# Patient Record
Sex: MALE | Race: WHITE | NOT HISPANIC OR LATINO | Employment: OTHER | URBAN - METROPOLITAN AREA
[De-identification: names, ages, dates, MRNs, and addresses within clinical notes are randomized per-mention and may not be internally consistent; named-entity substitution may affect disease eponyms.]

---

## 2023-04-19 LAB
ALANINE AMINOTRANSFERASE (SGPT) (U/L) IN SER/PLAS: 23 U/L (ref 10–52)
ALBUMIN (G/DL) IN SER/PLAS: 4.6 G/DL (ref 3.4–5)
ALKALINE PHOSPHATASE (U/L) IN SER/PLAS: 71 U/L (ref 33–136)
ANION GAP IN SER/PLAS: 15 MMOL/L (ref 10–20)
ASPARTATE AMINOTRANSFERASE (SGOT) (U/L) IN SER/PLAS: 23 U/L (ref 9–39)
BASOPHILS (10*3/UL) IN BLOOD BY AUTOMATED COUNT: 0.03 X10E9/L (ref 0–0.1)
BASOPHILS/100 LEUKOCYTES IN BLOOD BY AUTOMATED COUNT: 0.6 % (ref 0–2)
BILIRUBIN TOTAL (MG/DL) IN SER/PLAS: 0.9 MG/DL (ref 0–1.2)
CALCIUM (MG/DL) IN SER/PLAS: 9.6 MG/DL (ref 8.6–10.6)
CARBON DIOXIDE, TOTAL (MMOL/L) IN SER/PLAS: 25 MMOL/L (ref 21–32)
CHLORIDE (MMOL/L) IN SER/PLAS: 109 MMOL/L (ref 98–107)
CHOLESTEROL (MG/DL) IN SER/PLAS: 210 MG/DL (ref 0–199)
CHOLESTEROL IN HDL (MG/DL) IN SER/PLAS: 49.4 MG/DL
CHOLESTEROL/HDL RATIO: 4.3
CREATININE (MG/DL) IN SER/PLAS: 1.42 MG/DL (ref 0.5–1.3)
EOSINOPHILS (10*3/UL) IN BLOOD BY AUTOMATED COUNT: 0.23 X10E9/L (ref 0–0.7)
EOSINOPHILS/100 LEUKOCYTES IN BLOOD BY AUTOMATED COUNT: 4.2 % (ref 0–6)
ERYTHROCYTE DISTRIBUTION WIDTH (RATIO) BY AUTOMATED COUNT: 13.1 % (ref 11.5–14.5)
ERYTHROCYTE MEAN CORPUSCULAR HEMOGLOBIN CONCENTRATION (G/DL) BY AUTOMATED: 33.3 G/DL (ref 32–36)
ERYTHROCYTE MEAN CORPUSCULAR VOLUME (FL) BY AUTOMATED COUNT: 94 FL (ref 80–100)
ERYTHROCYTES (10*6/UL) IN BLOOD BY AUTOMATED COUNT: 5.31 X10E12/L (ref 4.5–5.9)
GFR MALE: 53 ML/MIN/1.73M2
GLUCOSE (MG/DL) IN SER/PLAS: 90 MG/DL (ref 74–99)
HEMATOCRIT (%) IN BLOOD BY AUTOMATED COUNT: 49.8 % (ref 41–52)
HEMOGLOBIN (G/DL) IN BLOOD: 16.6 G/DL (ref 13.5–17.5)
IMMATURE GRANULOCYTES/100 LEUKOCYTES IN BLOOD BY AUTOMATED COUNT: 0.6 % (ref 0–0.9)
LDL: 133 MG/DL (ref 0–99)
LEUKOCYTES (10*3/UL) IN BLOOD BY AUTOMATED COUNT: 5.4 X10E9/L (ref 4.4–11.3)
LYMPHOCYTES (10*3/UL) IN BLOOD BY AUTOMATED COUNT: 1.19 X10E9/L (ref 1.2–4.8)
LYMPHOCYTES/100 LEUKOCYTES IN BLOOD BY AUTOMATED COUNT: 21.9 % (ref 13–44)
MONOCYTES (10*3/UL) IN BLOOD BY AUTOMATED COUNT: 0.65 X10E9/L (ref 0.1–1)
MONOCYTES/100 LEUKOCYTES IN BLOOD BY AUTOMATED COUNT: 11.9 % (ref 2–10)
NEUTROPHILS (10*3/UL) IN BLOOD BY AUTOMATED COUNT: 3.31 X10E9/L (ref 1.2–7.7)
NEUTROPHILS/100 LEUKOCYTES IN BLOOD BY AUTOMATED COUNT: 60.8 % (ref 40–80)
NRBC (PER 100 WBCS) BY AUTOMATED COUNT: 0 /100 WBC (ref 0–0)
PLATELETS (10*3/UL) IN BLOOD AUTOMATED COUNT: 176 X10E9/L (ref 150–450)
POTASSIUM (MMOL/L) IN SER/PLAS: 4.5 MMOL/L (ref 3.5–5.3)
PROSTATE SPECIFIC AG (NG/ML) IN SER/PLAS: 0.88 NG/ML (ref 0–4)
PROTEIN TOTAL: 7.2 G/DL (ref 6.4–8.2)
SODIUM (MMOL/L) IN SER/PLAS: 144 MMOL/L (ref 136–145)
THYROTROPIN (MIU/L) IN SER/PLAS BY DETECTION LIMIT <= 0.05 MIU/L: 1.46 MIU/L (ref 0.44–3.98)
THYROXINE (T4) FREE (NG/DL) IN SER/PLAS: 1.06 NG/DL (ref 0.78–1.48)
TRIGLYCERIDE (MG/DL) IN SER/PLAS: 139 MG/DL (ref 0–149)
UREA NITROGEN (MG/DL) IN SER/PLAS: 26 MG/DL (ref 6–23)
VLDL: 28 MG/DL (ref 0–40)

## 2023-11-22 ENCOUNTER — OFFICE VISIT (OUTPATIENT)
Dept: PRIMARY CARE | Facility: CLINIC | Age: 68
End: 2023-11-22
Payer: MEDICARE

## 2023-11-22 VITALS
RESPIRATION RATE: 16 BRPM | SYSTOLIC BLOOD PRESSURE: 132 MMHG | BODY MASS INDEX: 30.95 KG/M2 | OXYGEN SATURATION: 99 % | WEIGHT: 261 LBS | DIASTOLIC BLOOD PRESSURE: 70 MMHG | HEART RATE: 56 BPM

## 2023-11-22 DIAGNOSIS — I10 BENIGN ESSENTIAL HYPERTENSION: ICD-10-CM

## 2023-11-22 DIAGNOSIS — J40 BRONCHITIS: Primary | ICD-10-CM

## 2023-11-22 DIAGNOSIS — N28.9 ABNORMAL KIDNEY FUNCTION: ICD-10-CM

## 2023-11-22 DIAGNOSIS — E78.00 HYPERCHOLESTEROLEMIA: ICD-10-CM

## 2023-11-22 PROBLEM — I73.9 PERIPHERAL VASCULAR DISEASE (CMS-HCC): Status: RESOLVED | Noted: 2023-08-18 | Resolved: 2023-11-22

## 2023-11-22 PROBLEM — M54.2 NECK PAIN: Status: ACTIVE | Noted: 2023-11-22

## 2023-11-22 PROBLEM — N40.0 BENIGN PROSTATIC HYPERPLASIA: Status: ACTIVE | Noted: 2023-04-26

## 2023-11-22 PROBLEM — M50.90 CERVICAL DISC DISEASE: Status: ACTIVE | Noted: 2023-11-22

## 2023-11-22 PROBLEM — R53.83 MALAISE AND FATIGUE: Status: ACTIVE | Noted: 2023-11-22

## 2023-11-22 PROBLEM — I51.7 CARDIOMEGALY: Status: ACTIVE | Noted: 2022-11-10

## 2023-11-22 PROBLEM — R53.81 MALAISE AND FATIGUE: Status: ACTIVE | Noted: 2023-11-22

## 2023-11-22 PROBLEM — M54.50 LOW BACK PAIN SYNDROME: Status: ACTIVE | Noted: 2023-11-22

## 2023-11-22 PROBLEM — H16.149 SPK (SUPERFICIAL PUNCTATE KERATITIS): Status: ACTIVE | Noted: 2023-11-22

## 2023-11-22 PROBLEM — I73.9 PERIPHERAL VASCULAR DISEASE (CMS-HCC): Status: ACTIVE | Noted: 2023-08-18

## 2023-11-22 PROBLEM — N17.9 AKI (ACUTE KIDNEY INJURY) (CMS-HCC): Status: ACTIVE | Noted: 2023-08-18

## 2023-11-22 PROBLEM — S16.1XXA CERVICAL MYOFASCIAL STRAIN: Status: ACTIVE | Noted: 2023-11-22

## 2023-11-22 PROCEDURE — 1036F TOBACCO NON-USER: CPT | Performed by: INTERNAL MEDICINE

## 2023-11-22 PROCEDURE — 99214 OFFICE O/P EST MOD 30 MIN: CPT | Performed by: INTERNAL MEDICINE

## 2023-11-22 PROCEDURE — 3075F SYST BP GE 130 - 139MM HG: CPT | Performed by: INTERNAL MEDICINE

## 2023-11-22 PROCEDURE — 3078F DIAST BP <80 MM HG: CPT | Performed by: INTERNAL MEDICINE

## 2023-11-22 PROCEDURE — 1159F MED LIST DOCD IN RCRD: CPT | Performed by: INTERNAL MEDICINE

## 2023-11-22 RX ORDER — AZITHROMYCIN 500 MG/1
500 TABLET, FILM COATED ORAL DAILY
Qty: 5 TABLET | Refills: 0 | Status: SHIPPED | OUTPATIENT
Start: 2023-11-22 | End: 2023-11-27

## 2023-11-22 RX ORDER — ATORVASTATIN CALCIUM 10 MG/1
5 TABLET, FILM COATED ORAL
COMMUNITY
End: 2024-04-23 | Stop reason: SDUPTHER

## 2023-11-22 RX ORDER — PANTOPRAZOLE SODIUM 40 MG/1
40 TABLET, DELAYED RELEASE ORAL DAILY
COMMUNITY
End: 2023-11-22 | Stop reason: ALTCHOICE

## 2023-11-22 RX ORDER — ACETAMINOPHEN 325 MG/1
500 TABLET ORAL
COMMUNITY
Start: 2023-08-15

## 2023-11-22 RX ORDER — TAMSULOSIN HYDROCHLORIDE 0.4 MG/1
0.4 CAPSULE ORAL
COMMUNITY
Start: 2023-08-15 | End: 2023-11-22 | Stop reason: ALTCHOICE

## 2023-11-22 ASSESSMENT — ENCOUNTER SYMPTOMS
LOSS OF SENSATION IN FEET: 0
NEUROLOGICAL NEGATIVE: 1
MUSCULOSKELETAL NEGATIVE: 1
CARDIOVASCULAR NEGATIVE: 1
PSYCHIATRIC NEGATIVE: 1
GASTROINTESTINAL NEGATIVE: 1
EYES NEGATIVE: 1
DEPRESSION: 0
RESPIRATORY NEGATIVE: 1
OCCASIONAL FEELINGS OF UNSTEADINESS: 0
ALLERGIC/IMMUNOLOGIC NEGATIVE: 1
ENDOCRINE NEGATIVE: 1
CONSTITUTIONAL NEGATIVE: 1
HEMATOLOGIC/LYMPHATIC NEGATIVE: 1

## 2023-11-22 NOTE — PROGRESS NOTES
Subjective   Patient ID: Miah Ventura is a 68 y.o. male who presents for Follow-up.cough for 3 weeks denies SOB and no VALENTINE and los weight and no fever no fatigue  - only a productive cough of now is white greenish it was dark the first week     HPI     Review of Systems   Constitutional: Negative.    HENT: Negative.     Eyes: Negative.    Respiratory: Negative.     Cardiovascular: Negative.    Gastrointestinal: Negative.    Endocrine: Negative.    Musculoskeletal: Negative.    Skin: Negative.    Allergic/Immunologic: Negative.    Neurological: Negative.    Hematological: Negative.    Psychiatric/Behavioral: Negative.     All other systems reviewed and are negative.      Objective   /70   Pulse 56   Resp 16   Wt 118 kg (261 lb)   SpO2 99%   BMI 30.95 kg/m²     Physical Exam  Vitals and nursing note reviewed.   Constitutional:       Appearance: Normal appearance.   HENT:      Head: Normocephalic and atraumatic.      Right Ear: Tympanic membrane, ear canal and external ear normal.      Left Ear: Tympanic membrane, ear canal and external ear normal. There is no impacted cerumen.      Nose: Nose normal.      Mouth/Throat:      Mouth: Mucous membranes are moist.      Pharynx: Oropharynx is clear.   Eyes:      Extraocular Movements: Extraocular movements intact.      Conjunctiva/sclera: Conjunctivae normal.      Pupils: Pupils are equal, round, and reactive to light.   Cardiovascular:      Rate and Rhythm: Normal rate and regular rhythm.      Pulses: Normal pulses.      Heart sounds: Normal heart sounds. No murmur heard.  Pulmonary:      Effort: Pulmonary effort is normal. No respiratory distress.      Breath sounds: Normal breath sounds. No stridor. No wheezing, rhonchi or rales.   Chest:      Chest wall: No tenderness.   Abdominal:      General: Abdomen is flat. Bowel sounds are normal. There is no distension.      Palpations: Abdomen is soft. There is no mass.      Tenderness: There is no abdominal  tenderness. There is no right CVA tenderness, left CVA tenderness, guarding or rebound.      Hernia: No hernia is present.   Musculoskeletal:         General: Normal range of motion.      Cervical back: Normal range of motion and neck supple.   Skin:     General: Skin is warm.      Capillary Refill: Capillary refill takes less than 2 seconds.   Neurological:      General: No focal deficit present.      Mental Status: He is alert.      Cranial Nerves: No cranial nerve deficit.      Sensory: No sensory deficit.      Motor: No weakness.      Coordination: Coordination normal.      Gait: Gait normal.      Deep Tendon Reflexes: Reflexes normal.   Psychiatric:         Mood and Affect: Mood normal.         Behavior: Behavior normal. Behavior is cooperative.         Thought Content: Thought content normal.         Judgment: Judgment normal.         Assessment/Plan   Problem List Items Addressed This Visit             ICD-10-CM    Abnormal kidney function N28.9     CRI - Chronic Renal Insuficiency. Secondary to DM/HTN/Atherosclerosis. Follow BUN/Cr. and lytes.   RENOPROTECTION with ACEI/ARB (). Monitor microalbuminuria. Avoid hypotension and renal hypoperfusion. The patient was instructed to avoid NSAIDS and educate compliance with medications to control HTN(hypertension) and cholesterol and diabetes.                                     Benign essential hypertension I10     HTN - hypertension well/controlled .Target BP < 130/80  achieved. Educate low salt diet and exercise with weight loss. Educate home self monitoring and diary keeping. Educate risks of elevate blood pressure and benefits of prompt treatment.           Hypercholesterolemia E78.00     Hypercholesterolemia - Monitor lipid profile and educate patient upon risks of high cholesterol and targets. Educate diet and change in lifestyle and increase in exercises - Refill:  Atorvastatin   and educate compliance with medication and diet.           Bronchitis - Primary  J40     Chronic cough for 3 weeks and bronchitis and Bronchitis with no wheezing                                           Recommend:                                                                                                      Denies  Pharyngitis,                                                                                                                                                                               Start: Azithromycin 500 mg daily for 6 days  -                                                                                                                                                             Acute on chronic bronchitis     Allegra 180mg qD vs. Claritin 10 mg qD and Mucinex                                                                                                                                             Cough  - startAzithromycin 500 mg daily for 6 days  -  Avoid cold exposure and take vitamins C 500 and B complex  qD   Hycodan syrup one tea spoon qHS 4 oz.               Sleep apnea - Not/ Witnessed Reviewed with the patient the sleep study and Educate extensively    lifestyle changes = and weight loss and increase in physical activity - - no CPAP at this point -      Nephrolithiasis - left side - and now with status post stenting of the left ureter - and needs a follow up CT of Ultrasound to rule out.persistent nephrolithiasis - Educate extensively diet - to prevent nephrolithiasis -      Right ventricular enlargement vs. failure - and weakness - Reviewed with the patient the ECHO - cardiogram - and rule out sleep apnea which can cause increase in pulmonary pressure and hence the dyspnea of exertion (which now improved) -      Sleep apnea - recommend: sleep study in the lab -      HTN - hypertension well/controlled.Target BP < 130/80 mmHg is achieved. Educate low salt diet and exercise with weight loss. Educate home self monitoring and diary keeping. Educate risks of  elevate blood pressure and benefits of prompt treatment.      Hypercholesterolemia - Monitor lipid profile and educate patient upon risks of high cholesterol and targets. Educate diet and change in lifestyle and increase in exercises - Refill: Atorvastatin 10 mg daily and educate compliance with medication and diet.      Bradycardia - sinus - asymptomatic and monitor - denies shortness of breath or fatigue or chest pain -      Right shoulder pain - DJD - Degenerative Joint Disease, Chronic Arthritis, of the right shoulder. Pain control, and antiinflammatory medications : Recommend: Kenalog injection and start Voltaren gel 1% topically BID, and Tylenol and Tramadol/Vicodan 5/325 mg TID PRN. Educate exercises and referred the patient to PT (Physical Therapy). Get X-Ray's.            Risk Factors Identified During Visit: None.   Influenza: influenza vaccine was previously given.   Pneumovax 23/Prevnar 15: Pneumovax 23/Prevnar 15 vaccine was ordered.   Prevnar 20: Prevnar 20 vaccine was ordered.   Shingles Vaccine: Shingles vaccine was ordered.   Prostate cancer screening: Screening is current.   Colorectal Cancer Screening: screening is current.   Abdominal Aortic Aneurysm screening: screening is current.   HIV screening: screening not indicated.

## 2023-11-22 NOTE — ASSESSMENT & PLAN NOTE
Chronic cough for 3 weeks and bronchitis and Bronchitis with no wheezing                                           Recommend:                                                                                                      Denies  Pharyngitis,                                                                                                                                                                               Start: Azithromycin 500 mg daily for 6 days  -                                                                                                                                                             Acute on chronic bronchitis     Allegra 180mg qD vs. Claritin 10 mg qD and Mucinex                                                                                                                                             Cough  - startAzithromycin 500 mg daily for 6 days  -  Avoid cold exposure and take vitamins C 500 and B complex  qD   Hycodan syrup one tea spoon qHS 4 oz.

## 2023-11-22 NOTE — ASSESSMENT & PLAN NOTE
Hypercholesterolemia - Monitor lipid profile and educate patient upon risks of high cholesterol and targets. Educate diet and change in lifestyle and increase in exercises - Refill:  Atorvastatin   and educate compliance with medication and diet.

## 2023-11-22 NOTE — ASSESSMENT & PLAN NOTE
HTN - hypertension well/controlled .Target BP < 130/80  achieved. Educate low salt diet and exercise with weight loss. Educate home self monitoring and diary keeping. Educate risks of elevate blood pressure and benefits of prompt treatment.

## 2023-11-29 ENCOUNTER — ANCILLARY PROCEDURE (OUTPATIENT)
Dept: RADIOLOGY | Facility: CLINIC | Age: 68
End: 2023-11-29
Payer: MEDICARE

## 2023-11-29 DIAGNOSIS — J40 BRONCHITIS: ICD-10-CM

## 2023-11-29 PROCEDURE — 71046 X-RAY EXAM CHEST 2 VIEWS: CPT

## 2023-11-29 PROCEDURE — 71046 X-RAY EXAM CHEST 2 VIEWS: CPT | Performed by: RADIOLOGY

## 2024-04-22 ENCOUNTER — OFFICE VISIT (OUTPATIENT)
Dept: PRIMARY CARE | Facility: CLINIC | Age: 69
End: 2024-04-22
Payer: MEDICARE

## 2024-04-22 ENCOUNTER — LAB (OUTPATIENT)
Dept: LAB | Facility: LAB | Age: 69
End: 2024-04-22
Payer: MEDICARE

## 2024-04-22 VITALS
HEART RATE: 72 BPM | DIASTOLIC BLOOD PRESSURE: 75 MMHG | RESPIRATION RATE: 16 BRPM | SYSTOLIC BLOOD PRESSURE: 128 MMHG | WEIGHT: 261 LBS | BODY MASS INDEX: 30.82 KG/M2 | HEIGHT: 77 IN

## 2024-04-22 DIAGNOSIS — N40.1 BENIGN PROSTATIC HYPERPLASIA WITH URINARY FREQUENCY: ICD-10-CM

## 2024-04-22 DIAGNOSIS — Z00.00 MEDICARE ANNUAL WELLNESS VISIT, SUBSEQUENT: Primary | ICD-10-CM

## 2024-04-22 DIAGNOSIS — I10 BENIGN ESSENTIAL HYPERTENSION: ICD-10-CM

## 2024-04-22 DIAGNOSIS — R53.83 OTHER FATIGUE: ICD-10-CM

## 2024-04-22 DIAGNOSIS — N28.9 ABNORMAL KIDNEY FUNCTION: ICD-10-CM

## 2024-04-22 DIAGNOSIS — R35.0 BENIGN PROSTATIC HYPERPLASIA WITH URINARY FREQUENCY: ICD-10-CM

## 2024-04-22 DIAGNOSIS — G47.33 OBSTRUCTIVE SLEEP APNEA: ICD-10-CM

## 2024-04-22 DIAGNOSIS — E78.00 ELEVATED LDL CHOLESTEROL LEVEL: ICD-10-CM

## 2024-04-22 DIAGNOSIS — N20.0 NEPHROLITHIASIS: ICD-10-CM

## 2024-04-22 DIAGNOSIS — Z00.00 ROUTINE GENERAL MEDICAL EXAMINATION AT HEALTH CARE FACILITY: ICD-10-CM

## 2024-04-22 PROBLEM — M70.60 GREATER TROCHANTERIC BURSITIS: Status: ACTIVE | Noted: 2024-04-22

## 2024-04-22 PROBLEM — R00.1 SINUS BRADYCARDIA: Status: ACTIVE | Noted: 2024-04-22

## 2024-04-22 PROBLEM — Z94.7 HISTORY OF CORNEA TRANSPLANT: Status: ACTIVE | Noted: 2023-08-18

## 2024-04-22 PROBLEM — R05.9 COUGH: Status: RESOLVED | Noted: 2024-04-22 | Resolved: 2024-04-22

## 2024-04-22 PROBLEM — R26.2 DIFFICULTY IN WALKING: Status: ACTIVE | Noted: 2024-04-22

## 2024-04-22 PROBLEM — M25.551 RIGHT HIP PAIN: Status: ACTIVE | Noted: 2024-04-22

## 2024-04-22 PROBLEM — K40.90 LEFT INGUINAL HERNIA: Status: ACTIVE | Noted: 2024-04-22

## 2024-04-22 PROBLEM — E86.0 DEHYDRATION: Status: ACTIVE | Noted: 2024-04-22

## 2024-04-22 PROBLEM — H52.213 IRREGULAR ASTIGMATISM OF BOTH EYES: Status: ACTIVE | Noted: 2024-04-22

## 2024-04-22 PROBLEM — T16.1XXA FOREIGN BODY IN EAR, RIGHT, INITIAL ENCOUNTER: Status: ACTIVE | Noted: 2024-04-22

## 2024-04-22 PROBLEM — M51.26 DISC DISPLACEMENT, LUMBAR: Status: ACTIVE | Noted: 2024-04-22

## 2024-04-22 PROBLEM — M70.71 BURSITIS OF RIGHT HIP: Status: ACTIVE | Noted: 2024-04-22

## 2024-04-22 PROBLEM — K40.90 RIGHT INGUINAL HERNIA: Status: ACTIVE | Noted: 2023-08-18

## 2024-04-22 PROBLEM — H52.209 ASTIGMATISM FOLLOWING CORNEAL TRANSPLANT: Status: ACTIVE | Noted: 2024-04-22

## 2024-04-22 PROBLEM — R07.89 ATYPICAL CHEST PAIN: Status: RESOLVED | Noted: 2024-04-22 | Resolved: 2024-04-22

## 2024-04-22 PROBLEM — M17.12 ARTHRITIS OF KNEE, LEFT: Status: ACTIVE | Noted: 2024-04-22

## 2024-04-22 PROBLEM — T86.8489 ASTIGMATISM FOLLOWING CORNEAL TRANSPLANT: Status: ACTIVE | Noted: 2024-04-22

## 2024-04-22 PROBLEM — H18.609 KERATOCONUS: Status: ACTIVE | Noted: 2024-04-22

## 2024-04-22 PROBLEM — H25.813 COMBINED FORM OF AGE-RELATED CATARACT, BOTH EYES: Status: ACTIVE | Noted: 2024-04-22

## 2024-04-22 PROBLEM — H52.209 ASTIGMATISM: Status: ACTIVE | Noted: 2023-08-18

## 2024-04-22 PROBLEM — H35.362 DRUSEN OF LEFT MACULA: Status: ACTIVE | Noted: 2023-08-18

## 2024-04-22 PROBLEM — N20.1 LEFT URETERAL CALCULUS: Status: ACTIVE | Noted: 2023-09-07

## 2024-04-22 PROBLEM — H25.13 CATARACT, NUCLEAR SCLEROTIC, BOTH EYES: Status: ACTIVE | Noted: 2024-04-22

## 2024-04-22 LAB
ALBUMIN SERPL BCP-MCNC: 4.6 G/DL (ref 3.4–5)
ALP SERPL-CCNC: 66 U/L (ref 33–136)
ALT SERPL W P-5'-P-CCNC: 19 U/L (ref 10–52)
ANION GAP SERPL CALC-SCNC: 13 MMOL/L (ref 10–20)
AST SERPL W P-5'-P-CCNC: 19 U/L (ref 9–39)
BASOPHILS # BLD AUTO: 0.03 X10*3/UL (ref 0–0.1)
BASOPHILS NFR BLD AUTO: 0.5 %
BILIRUB SERPL-MCNC: 0.6 MG/DL (ref 0–1.2)
BUN SERPL-MCNC: 23 MG/DL (ref 6–23)
CALCIUM SERPL-MCNC: 9.3 MG/DL (ref 8.6–10.6)
CHLORIDE SERPL-SCNC: 108 MMOL/L (ref 98–107)
CHOLEST SERPL-MCNC: 206 MG/DL (ref 0–199)
CHOLESTEROL/HDL RATIO: 3.9
CO2 SERPL-SCNC: 27 MMOL/L (ref 21–32)
CREAT SERPL-MCNC: 1.31 MG/DL (ref 0.5–1.3)
EGFRCR SERPLBLD CKD-EPI 2021: 59 ML/MIN/1.73M*2
EOSINOPHIL # BLD AUTO: 0.18 X10*3/UL (ref 0–0.7)
EOSINOPHIL NFR BLD AUTO: 3.3 %
ERYTHROCYTE [DISTWIDTH] IN BLOOD BY AUTOMATED COUNT: 12.7 % (ref 11.5–14.5)
GLUCOSE SERPL-MCNC: 98 MG/DL (ref 74–99)
HCT VFR BLD AUTO: 45.8 % (ref 41–52)
HDLC SERPL-MCNC: 52.3 MG/DL
HGB BLD-MCNC: 15.4 G/DL (ref 13.5–17.5)
IMM GRANULOCYTES # BLD AUTO: 0.03 X10*3/UL (ref 0–0.7)
IMM GRANULOCYTES NFR BLD AUTO: 0.5 % (ref 0–0.9)
LDLC SERPL CALC-MCNC: 137 MG/DL
LYMPHOCYTES # BLD AUTO: 0.94 X10*3/UL (ref 1.2–4.8)
LYMPHOCYTES NFR BLD AUTO: 17 %
MCH RBC QN AUTO: 31.9 PG (ref 26–34)
MCHC RBC AUTO-ENTMCNC: 33.6 G/DL (ref 32–36)
MCV RBC AUTO: 95 FL (ref 80–100)
MONOCYTES # BLD AUTO: 0.54 X10*3/UL (ref 0.1–1)
MONOCYTES NFR BLD AUTO: 9.8 %
NEUTROPHILS # BLD AUTO: 3.8 X10*3/UL (ref 1.2–7.7)
NEUTROPHILS NFR BLD AUTO: 68.9 %
NON HDL CHOLESTEROL: 154 MG/DL (ref 0–149)
NRBC BLD-RTO: 0 /100 WBCS (ref 0–0)
PLATELET # BLD AUTO: 178 X10*3/UL (ref 150–450)
POTASSIUM SERPL-SCNC: 4.6 MMOL/L (ref 3.5–5.3)
PROT SERPL-MCNC: 7.1 G/DL (ref 6.4–8.2)
PSA SERPL-MCNC: 0.87 NG/ML
RBC # BLD AUTO: 4.83 X10*6/UL (ref 4.5–5.9)
SODIUM SERPL-SCNC: 143 MMOL/L (ref 136–145)
TRIGL SERPL-MCNC: 86 MG/DL (ref 0–149)
TSH SERPL-ACNC: 1.67 MIU/L (ref 0.44–3.98)
VLDL: 17 MG/DL (ref 0–40)
WBC # BLD AUTO: 5.5 X10*3/UL (ref 4.4–11.3)

## 2024-04-22 PROCEDURE — 1160F RVW MEDS BY RX/DR IN RCRD: CPT | Performed by: INTERNAL MEDICINE

## 2024-04-22 PROCEDURE — 80061 LIPID PANEL: CPT

## 2024-04-22 PROCEDURE — 99214 OFFICE O/P EST MOD 30 MIN: CPT | Performed by: INTERNAL MEDICINE

## 2024-04-22 PROCEDURE — 3078F DIAST BP <80 MM HG: CPT | Performed by: INTERNAL MEDICINE

## 2024-04-22 PROCEDURE — 1159F MED LIST DOCD IN RCRD: CPT | Performed by: INTERNAL MEDICINE

## 2024-04-22 PROCEDURE — 3074F SYST BP LT 130 MM HG: CPT | Performed by: INTERNAL MEDICINE

## 2024-04-22 PROCEDURE — 80053 COMPREHEN METABOLIC PANEL: CPT

## 2024-04-22 PROCEDURE — 1170F FXNL STATUS ASSESSED: CPT | Performed by: INTERNAL MEDICINE

## 2024-04-22 PROCEDURE — 36415 COLL VENOUS BLD VENIPUNCTURE: CPT

## 2024-04-22 PROCEDURE — 84153 ASSAY OF PSA TOTAL: CPT

## 2024-04-22 PROCEDURE — 85025 COMPLETE CBC W/AUTO DIFF WBC: CPT

## 2024-04-22 PROCEDURE — 84443 ASSAY THYROID STIM HORMONE: CPT

## 2024-04-22 PROCEDURE — 1036F TOBACCO NON-USER: CPT | Performed by: INTERNAL MEDICINE

## 2024-04-22 PROCEDURE — G0439 PPPS, SUBSEQ VISIT: HCPCS | Performed by: INTERNAL MEDICINE

## 2024-04-22 ASSESSMENT — ENCOUNTER SYMPTOMS
CONSTITUTIONAL NEGATIVE: 1
CARDIOVASCULAR NEGATIVE: 1
NEUROLOGICAL NEGATIVE: 1
ENDOCRINE NEGATIVE: 1
MUSCULOSKELETAL NEGATIVE: 1
DEPRESSION: 0
GASTROINTESTINAL NEGATIVE: 1
PSYCHIATRIC NEGATIVE: 1
OCCASIONAL FEELINGS OF UNSTEADINESS: 0
EYES NEGATIVE: 1
LOSS OF SENSATION IN FEET: 0
RESPIRATORY NEGATIVE: 1
ALLERGIC/IMMUNOLOGIC NEGATIVE: 1
HEMATOLOGIC/LYMPHATIC NEGATIVE: 1

## 2024-04-22 ASSESSMENT — PATIENT HEALTH QUESTIONNAIRE - PHQ9
1. LITTLE INTEREST OR PLEASURE IN DOING THINGS: NOT AT ALL
SUM OF ALL RESPONSES TO PHQ9 QUESTIONS 1 AND 2: 0
2. FEELING DOWN, DEPRESSED OR HOPELESS: NOT AT ALL

## 2024-04-22 ASSESSMENT — ACTIVITIES OF DAILY LIVING (ADL)
DRESSING: INDEPENDENT
GROCERY_SHOPPING: INDEPENDENT
DOING_HOUSEWORK: INDEPENDENT
BATHING: INDEPENDENT
MANAGING_FINANCES: INDEPENDENT
TAKING_MEDICATION: INDEPENDENT

## 2024-04-22 NOTE — ASSESSMENT & PLAN NOTE
BPH - Benign PROSTATIC Hypertrophy, denies Dysuria/Nocturia, check PSA,Educate exercises and Change in life style, prescribe vitamin E 400 IU qD. Consider referral to urology.

## 2024-04-22 NOTE — ASSESSMENT & PLAN NOTE
Status Post lithotrypsy and stenting and removal and and educate extensively diet and increase in fluid intake

## 2024-04-22 NOTE — ASSESSMENT & PLAN NOTE
No recent hospitalizations.    All medications reviewed and reconciled by me the provider..  No use of controlled substances or opiates.    Family history, social history reviewed, no changes.    Patient does not smoke.    Patient does not drink.    Patient hydrates adequately daily.  Eats a well-balanced healthy diet.     Exercises adequately including walking and doing weightbearing exercises.    Patient denies any difficulty with memory or cognition.     Denies any difficulty with hearing.  Patient does not wear hearing aids.    No fall risk.  No recent falls.  Denies any difficulty walking.    Patient with no history of depression anxiety, denies any loss of interest, no feeling of sadness, no lack of motivation.    Patient is independent in all ADLs and IADLs.  Independent bathing, dressing, walking.  Takes care of own finances, shopping and cooking.     End-of-life decision-making power of  reviewed with patient.     Risk Factors Identified During Visit: None.   Influenza: influenza vaccine was previously given.   Pneumovax 23: Pneumovax 23 vaccine was previously given.   Prevnar 13: Prevnar 13 vaccine was previously given.   Shingles Vaccine: Shingles vaccine was previously given.   Prostate cancer screening: Screening is current.   Colorectal Cancer Screening: screening is current.   Abdominal Aortic Aneurysm screening: screening is current.   HIV screening: screening not indicated.       Preventive measures - Recommend ASAP :  Colonoscopy (educate patient risks of colon cancer) refer patient to GI specialist. Ophthalmology and retina exam recommend yearly exams refer patient to an Ophthalmologist. BPH - (Benign Prostatic Hypertrophy) refer patient to an Urologist for rectal exam and PSA check.

## 2024-04-22 NOTE — PROGRESS NOTES
"Subjective   Patient ID: Miah Ventrua is a 69 y.o. male who presents for Medicare Annual Wellness Visit Subsequent (Mcr/Follow up). Losing a lot of weight rosemarie streessed out in the tax season Status Post nephrolithiasis on the left side recurring could not pass it and needed lithotripsy -      HPI     Review of Systems   Constitutional: Negative.    HENT: Negative.     Eyes: Negative.    Respiratory: Negative.     Cardiovascular: Negative.    Gastrointestinal: Negative.    Endocrine: Negative.    Musculoskeletal: Negative.    Skin: Negative.    Allergic/Immunologic: Negative.    Neurological: Negative.    Hematological: Negative.    Psychiatric/Behavioral: Negative.     All other systems reviewed and are negative.      Objective   Ht 1.956 m (6' 5\")   Wt 118 kg (261 lb)   BMI 30.95 kg/m²   Blood pressure 128/75, pulse 72, resp. rate 16, height 1.956 m (6' 5\"), weight 118 kg (261 lb).   Physical Exam  Vitals and nursing note reviewed.   Constitutional:       Appearance: Normal appearance.   HENT:      Head: Normocephalic and atraumatic.      Right Ear: Tympanic membrane, ear canal and external ear normal.      Left Ear: Tympanic membrane, ear canal and external ear normal. There is no impacted cerumen.      Nose: Nose normal.      Mouth/Throat:      Mouth: Mucous membranes are moist.      Pharynx: Oropharynx is clear.   Eyes:      Extraocular Movements: Extraocular movements intact.      Conjunctiva/sclera: Conjunctivae normal.      Pupils: Pupils are equal, round, and reactive to light.   Cardiovascular:      Rate and Rhythm: Normal rate and regular rhythm.      Pulses: Normal pulses.      Heart sounds: Normal heart sounds. No murmur heard.  Pulmonary:      Effort: Pulmonary effort is normal. No respiratory distress.      Breath sounds: Normal breath sounds. No stridor. No wheezing, rhonchi or rales.   Chest:      Chest wall: No tenderness.   Abdominal:      General: Abdomen is flat. Bowel sounds are normal. " There is no distension.      Palpations: Abdomen is soft. There is no mass.      Tenderness: There is no abdominal tenderness. There is no right CVA tenderness, left CVA tenderness, guarding or rebound.      Hernia: No hernia is present.   Musculoskeletal:         General: Normal range of motion.      Cervical back: Normal range of motion and neck supple.   Skin:     General: Skin is warm.      Capillary Refill: Capillary refill takes less than 2 seconds.   Neurological:      General: No focal deficit present.      Mental Status: He is alert.      Cranial Nerves: No cranial nerve deficit.      Sensory: No sensory deficit.      Motor: No weakness.      Coordination: Coordination normal.      Gait: Gait normal.      Deep Tendon Reflexes: Reflexes normal.   Psychiatric:         Mood and Affect: Mood normal.         Behavior: Behavior normal. Behavior is cooperative.         Thought Content: Thought content normal.         Judgment: Judgment normal.         Assessment/Plan   Problem List Items Addressed This Visit             ICD-10-CM    Abnormal kidney function N28.9     CRI - Chronic Renal Insuficiency. Secondary to DM/HTN/Atherosclerosis. Follow BUN/Cr. and lytes.   RENOPROTECTION with ACEI/ARB (). Monitor microalbuminuria. Avoid hypotension and renal hypoperfusion. The patient was instructed to avoid NSAIDS and educate compliance with medications to control HTN(hypertension) and cholesterol and diabetes.                                     Benign essential hypertension I10     HTN - hypertension well/controlled .Target BP < 130/80  achieved. Educate low salt diet and exercise with weight loss. Educate home self monitoring and diary keeping. Educate risks of elevate blood pressure and benefits of prompt treatment.           Relevant Orders    Comprehensive Metabolic Panel    Benign prostatic hyperplasia N40.0     BPH - Benign PROSTATIC Hypertrophy, denies Dysuria/Nocturia, check PSA,Educate exercises and Change in  life style, prescribe vitamin E 400 IU qD. Consider referral to urology.          Relevant Orders    Prostate Specific Antigen    Elevated LDL cholesterol level E78.00     Hypercholesterolemia - Monitor lipid profile and educate patient upon risks of high cholesterol and targets. Educate diet and change in lifestyle and increase in exercises - Refill:   and educate compliance with medication and diet.           Relevant Orders    Lipid Panel    Nephrolithiasis N20.0     Status Post lithotrypsy and stenting and removal and and educate extensively diet and increase in fluid intake          Obstructive sleep apnea G47.33     Lost approximately 20 Lbs and feels better and recommend increase in exercise activity          Medicare annual wellness visit, subsequent - Primary Z00.00     No recent hospitalizations.    All medications reviewed and reconciled by me the provider..  No use of controlled substances or opiates.    Family history, social history reviewed, no changes.    Patient does not smoke.    Patient does not drink.    Patient hydrates adequately daily.  Eats a well-balanced healthy diet.     Exercises adequately including walking and doing weightbearing exercises.    Patient denies any difficulty with memory or cognition.     Denies any difficulty with hearing.  Patient does not wear hearing aids.    No fall risk.  No recent falls.  Denies any difficulty walking.    Patient with no history of depression anxiety, denies any loss of interest, no feeling of sadness, no lack of motivation.    Patient is independent in all ADLs and IADLs.  Independent bathing, dressing, walking.  Takes care of own finances, shopping and cooking.     End-of-life decision-making power of  reviewed with patient.     Risk Factors Identified During Visit: None.   Influenza: influenza vaccine was previously given.   Pneumovax 23: Pneumovax 23 vaccine was previously given.   Prevnar 13: Prevnar 13 vaccine was previously given.    Shingles Vaccine: Shingles vaccine was previously given.   Prostate cancer screening: Screening is current.   Colorectal Cancer Screening: screening is current.   Abdominal Aortic Aneurysm screening: screening is current.   HIV screening: screening not indicated.       Preventive measures - Recommend ASAP :  Colonoscopy (educate patient risks of colon cancer) refer patient to GI specialist. Ophthalmology and retina exam recommend yearly exams refer patient to an Ophthalmologist. BPH - (Benign Prostatic Hypertrophy) refer patient to an Urologist for rectal exam and PSA check.           Other Visit Diagnoses         Codes    Other fatigue     R53.83    Relevant Orders    CBC and Auto Differential    TSH with reflex to Free T4 if abnormal            Abnormal kidney function N28.9        CRI - Chronic Renal Insuficiency. Secondary to DM/HTN/Atherosclerosis. Follow BUN/Cr. and lytes.   RENOPROTECTION with ACEI/ARB (). Monitor microalbuminuria. Avoid hypotension and renal hypoperfusion. The patient was instructed to avoid NSAIDS and educate compliance with medications to control HTN(hypertension) and cholesterol and diabetes.                                       Benign essential hypertension I10       HTN - hypertension well/controlled .Target BP < 130/80  achieved. Educate low salt diet and exercise with weight loss. Educate home self monitoring and diary keeping. Educate risks of elevate blood pressure and benefits of prompt treatment.             Hypercholesterolemia E78.00       Hypercholesterolemia - Monitor lipid profile and educate patient upon risks of high cholesterol and targets. Educate diet and change in lifestyle and increase in exercises - Refill:  Atorvastatin   and educate compliance with medication and diet.                            Sleep apnea - Not/ Witnessed Reviewed with the patient the sleep study and Educate extensively    lifestyle changes = and weight loss and increase in physical activity -  - no CPAP at this point -      Nephrolithiasis - left side - and now with status post stenting of the left ureter - and needs a follow up CT of Ultrasound to rule out.persistent nephrolithiasis - Educate extensively diet - to prevent nephrolithiasis -      Right ventricular enlargement vs. failure - and weakness - Reviewed with the patient the ECHO - cardiogram - and rule out sleep apnea which can cause increase in pulmonary pressure and hence the dyspnea of exertion (which now improved) -      Sleep apnea - recommend: sleep study in the lab -      HTN - hypertension well/controlled.Target BP < 130/80 mmHg is achieved. Educate low salt diet and exercise with weight loss. Educate home self monitoring and diary keeping. Educate risks of elevate blood pressure and benefits of prompt treatment.      Hypercholesterolemia - Monitor lipid profile and educate patient upon risks of high cholesterol and targets. Educate diet and change in lifestyle and increase in exercises - Refill: Atorvastatin 10 mg daily and educate compliance with medication and diet.      Bradycardia - sinus - asymptomatic and monitor - denies shortness of breath or fatigue or chest pain -      Right shoulder pain - DJD - Degenerative Joint Disease, Chronic Arthritis, of the right shoulder. Pain control, and antiinflammatory medications : Recommend: Kenalog injection and start Voltaren gel 1% topically BID, and Tylenol and Tramadol/Vicodan 5/325 mg TID PRN. Educate exercises and referred the patient to PT (Physical Therapy). Get X-Ray's.            Risk Factors Identified During Visit: None.   Influenza: influenza vaccine was previously given.   Pneumovax 23/Prevnar 15: Pneumovax 23/Prevnar 15 vaccine was ordered.   Prevnar 20: Prevnar 20 vaccine was ordered.   Shingles Vaccine: Shingles vaccine was ordered.   Prostate cancer screening: Screening is current.   Colorectal Cancer Screening: screening is current.   Abdominal Aortic Aneurysm screening:  screening is current.   HIV screening: screening not indicated.                   Immunizations/Injections      very important  Immunizations from outside sources need reconciliation.      Influenza, High Dose Seasonal, Preservative Free11/20/2019  Influenza, Seasonal, Quadrivalent, Gzrxzbzmjx28/10/2023  Moderna COVID-19 vaccine, bivalent, blue cap/gray label *Check age/dose*9/10/2022  Pfizer COVID-19 vaccine, Fall 2023, 12 years and older, (30mcg/0.3mL)10/10/2023  Pfizer Gray Cap SARS-CoV-24/23/2022  Pfizer Purple Cap SARS-CoV-210/9/2021, 2/27/2021, 2/6/2021  Tdap vaccine, age 7 year and older (BOOSTRIX, ADACEL)5/8/2

## 2024-04-22 NOTE — ASSESSMENT & PLAN NOTE
Hypercholesterolemia - Monitor lipid profile and educate patient upon risks of high cholesterol and targets. Educate diet and change in lifestyle and increase in exercises - Refill:   and educate compliance with medication and diet.     -Bleeding from circumcision site (boy babies only) [Follow-Up: _____] : a [unfilled] follow-up visit [Other: _____] : [unfilled]

## 2024-04-23 ENCOUNTER — TELEPHONE (OUTPATIENT)
Dept: PRIMARY CARE | Facility: CLINIC | Age: 69
End: 2024-04-23
Payer: MEDICARE

## 2024-04-23 DIAGNOSIS — E78.00 HYPERCHOLESTEROLEMIA: ICD-10-CM

## 2024-04-23 RX ORDER — ATORVASTATIN CALCIUM 10 MG/1
10 TABLET, FILM COATED ORAL
Qty: 90 TABLET | Refills: 0 | Status: SHIPPED | OUTPATIENT
Start: 2024-04-23

## 2024-05-03 ENCOUNTER — OFFICE VISIT (OUTPATIENT)
Dept: OPHTHALMOLOGY | Facility: CLINIC | Age: 69
End: 2024-05-03
Payer: MEDICARE

## 2024-05-03 DIAGNOSIS — H35.362 DRUSEN OF LEFT MACULA: ICD-10-CM

## 2024-05-03 DIAGNOSIS — Z94.7 HISTORY OF CORNEA TRANSPLANT: ICD-10-CM

## 2024-05-03 DIAGNOSIS — H52.213 IRREGULAR ASTIGMATISM OF BOTH EYES: ICD-10-CM

## 2024-05-03 DIAGNOSIS — H04.123 DRY EYES: ICD-10-CM

## 2024-05-03 DIAGNOSIS — H25.813 COMBINED FORMS OF AGE-RELATED CATARACT OF BOTH EYES: ICD-10-CM

## 2024-05-03 DIAGNOSIS — H18.601 KERATOCONUS OF RIGHT EYE: Primary | ICD-10-CM

## 2024-05-03 DIAGNOSIS — H16.122 FILAMENTARY KERATITIS OF LEFT EYE: ICD-10-CM

## 2024-05-03 LAB
CENTRAL CORNEA THICKNESS (OD): 572 MICRONS
CENTRAL CORNEA THICKNESS (OS): 547 MICRONS
KMAX (OD): 47.5 DIOPTERS
KMAX (OS): 56.8 DIOPTERS
PACH THINNEST (OD): 565 MICRONS
PACH THINNEST (OS): 547 MICRONS
SIMK FLAT (OD): 42.5 DIOPTERS
SIMK FLAT (OS): 46.2 DIOPTERS
SIMK STEEP (OD): 44.4 DIOPTERS
SIMK STEEP (OS): 48.9 DIOPTERS
SIMK STEEP AXIS (OD): 136.9 DEGREES
SIMK STEEP AXIS (OS): 77.1 DEGREES

## 2024-05-03 PROCEDURE — 99212 OFFICE O/P EST SF 10 MIN: CPT | Performed by: OPTOMETRIST

## 2024-05-03 PROCEDURE — 92072 FITG C-LENS KERATOCONUS 1ST: CPT | Performed by: OPTOMETRIST

## 2024-05-03 PROCEDURE — 92025 CPTRIZED CORNEAL TOPOGRAPHY: CPT | Performed by: OPTOMETRIST

## 2024-05-03 PROCEDURE — 92015 DETERMINE REFRACTIVE STATE: CPT | Performed by: OPTOMETRIST

## 2024-05-03 PROCEDURE — V2531 CONTACT LENS GAS PERMEABLE: HCPCS | Performed by: OPTOMETRIST

## 2024-05-03 ASSESSMENT — CONF VISUAL FIELD
METHOD: COUNTING FINGERS
OD_SUPERIOR_TEMPORAL_RESTRICTION: 0
OS_INFERIOR_TEMPORAL_RESTRICTION: 0
OS_INFERIOR_NASAL_RESTRICTION: 0
OS_SUPERIOR_TEMPORAL_RESTRICTION: 0
OD_SUPERIOR_NASAL_RESTRICTION: 0
OD_INFERIOR_NASAL_RESTRICTION: 0
OD_INFERIOR_TEMPORAL_RESTRICTION: 0
OS_SUPERIOR_NASAL_RESTRICTION: 0
OS_NORMAL: 1
OD_NORMAL: 1

## 2024-05-03 ASSESSMENT — VISUAL ACUITY
VA_OR_OD_CURRENT_RX: 20/25+1
OD_PH_CC: 20/25
OD_CC: 20/40
CORRECTION_TYPE: GLASSES
VA_OR_OD_CURRENT_RX: 20/25-1
METHOD: SNELLEN - LINEAR
VA_OR_OS_CURRENT_RX: 20/30-2
OS_PH_CC: 20/40
OD_CC+: -1
OS_CC: 20/60
OD_PH_CC+: -2
OD_CC: J1 (-)
OS_PH_CC+: -1
OS_CC+: -1
VA_OR_OS_CURRENT_RX: 20/25-2
OS_CC: J5

## 2024-05-03 ASSESSMENT — ENCOUNTER SYMPTOMS
GASTROINTESTINAL NEGATIVE: 0
ENDOCRINE NEGATIVE: 0
MUSCULOSKELETAL NEGATIVE: 0
PSYCHIATRIC NEGATIVE: 0
HEMATOLOGIC/LYMPHATIC NEGATIVE: 0
RESPIRATORY NEGATIVE: 0
NEUROLOGICAL NEGATIVE: 0
CONSTITUTIONAL NEGATIVE: 0
CARDIOVASCULAR NEGATIVE: 0
ALLERGIC/IMMUNOLOGIC NEGATIVE: 0
EYES NEGATIVE: 1

## 2024-05-03 ASSESSMENT — REFRACTION_CURRENTRX
OD_DIAMETER: 16.0
OS_ADDL_SPECS: 3800 38-44
OD_BRAND: SYNERGYES VS
OS_ADDL_SPECS: 3600 38-44
OD_BRAND: AVT PRIME KONE
OD_SPHERE: PLANO
OS_DIAMETER: 16.0
OS_SPHERE: PLANO
OS_BRAND: SYNERGYES VS
OS_BRAND: AVT PRIME KONE
OD_ADDL_SPECS: 3400 38-44
OS_BASECURVE: 8.4
OS_SPHERE: -17.00
OD_SPHERE: -7.75
OD_ADDL_SPECS: 3400 36-42
OS_BRAND: SYNERGYES VS
OS_SPHERE: -9.50
OS_BASECURVE: 8.4
OD_BASECURVE: 8.4
OD_DIAMETER: 16.0
OD_BRAND: SYNERGYES VS
OD_CYLINDER: SPHERE
OD_SPHERE: -13.50
OS_DIAMETER: 16.5
OD_BASECURVE: 8.4
OD_DIAMETER: 9.6
OS_CYLINDER: SPHERE
OD_BASECURVE: 7.50
OS_BASECURVE: 7.20
OS_DIAMETER: 10.0

## 2024-05-03 ASSESSMENT — REFRACTION_MANIFEST
OD_SPHERE: -9.00
OS_AXIS: 166
OD_ADD: +2.50
OS_CYLINDER: -4.75
OD_CYLINDER: -5.00
OD_AXIS: 044
OS_SPHERE: -17.75
OS_ADD: +2.50

## 2024-05-03 ASSESSMENT — EXTERNAL EXAM - LEFT EYE: OS_EXAM: NORMAL

## 2024-05-03 ASSESSMENT — REFRACTION_WEARINGRX
OS_ADD: +2.50
OS_SPHERE: -16.75
OS_CYLINDER: -6.00
OS_AXIS: 164
OD_AXIS: 044
OD_ADD: +2.50
OD_CYLINDER: -4.00
OD_SPHERE: -10.50

## 2024-05-03 ASSESSMENT — TONOMETRY
OS_IOP_MMHG: 19
OD_IOP_MMHG: 19
IOP_METHOD: GOLDMANN APPLANATION

## 2024-05-03 ASSESSMENT — EXTERNAL EXAM - RIGHT EYE: OD_EXAM: NORMAL

## 2024-05-03 NOTE — PROGRESS NOTES
Assessment/Plan   Diagnoses and all orders for this visit:  Keratoconus of right eye  Dry eyes  History of cornea transplant  Irregular astigmatism of both eyes  Filamentary keratitis of left eye  -Pt currently in AVT [facility]. Fit into [employer] VS OU. Adjusted fit, good comfort & vision. Ordered CL lenses today.   -Once CL arrives, call patient to schedule CL P/U, I&R and DFE.     Drusen of left macula  Combined forms of age-related cataract of both eyes  -Not directly assessed today due to time constraint. RTC once new CL arrive and perform DFE & Mac OCT.        A spectacle prescription was dispensed to be used as needed.

## 2024-05-03 NOTE — Clinical Note
Both eyes (OU) Contact Lens Order  Quantity: 1 Package: RGP Appointment needed? Yes Medically necessary[facility] Additional instructions: RX3 VS lenses

## 2024-05-31 ENCOUNTER — OFFICE VISIT (OUTPATIENT)
Dept: OPHTHALMOLOGY | Facility: CLINIC | Age: 69
End: 2024-05-31
Payer: MEDICARE

## 2024-05-31 DIAGNOSIS — H04.123 DRY EYES: Primary | ICD-10-CM

## 2024-05-31 DIAGNOSIS — Z94.7 HISTORY OF CORNEA TRANSPLANT: ICD-10-CM

## 2024-05-31 DIAGNOSIS — H18.601 KERATOCONUS OF RIGHT EYE: ICD-10-CM

## 2024-05-31 DIAGNOSIS — H52.213 IRREGULAR ASTIGMATISM OF BOTH EYES: ICD-10-CM

## 2024-05-31 PROCEDURE — TAXCU SALES TAX CUYAHOGA COUNTY: Performed by: OPTOMETRIST

## 2024-05-31 PROCEDURE — 99212 OFFICE O/P EST SF 10 MIN: CPT | Performed by: OPTOMETRIST

## 2024-05-31 PROCEDURE — 99070(U19) SCLERAFIL(U19): Performed by: OPTOMETRIST

## 2024-05-31 ASSESSMENT — ENCOUNTER SYMPTOMS
RESPIRATORY NEGATIVE: 0
NEUROLOGICAL NEGATIVE: 0
PSYCHIATRIC NEGATIVE: 0
CONSTITUTIONAL NEGATIVE: 0
ENDOCRINE NEGATIVE: 0
GASTROINTESTINAL NEGATIVE: 0
CARDIOVASCULAR NEGATIVE: 1
MUSCULOSKELETAL NEGATIVE: 0
HEMATOLOGIC/LYMPHATIC NEGATIVE: 0
EYES NEGATIVE: 0
ALLERGIC/IMMUNOLOGIC NEGATIVE: 0

## 2024-05-31 ASSESSMENT — REFRACTION_CURRENTRX
OD_BASECURVE: 8.4
OD_BRAND: SYNERGYES VS
OS_SPHERE: -9.50
OS_BRAND: SYNERGYES VS
OD_ADDL_SPECS: 3400 36-42
OS_CYLINDER: SPHERE
OS_DIAMETER: 16.5
OD_DIAMETER: 16.0
OD_SPHERE: -7.75
OD_CYLINDER: SPHERE
OS_BASECURVE: 8.4
OS_ADDL_SPECS: 3800 38-44

## 2024-05-31 ASSESSMENT — VISUAL ACUITY
OD_CC+: -2
OD_CC: 20/40
OS_CC: 20/25
METHOD: SNELLEN - LINEAR
CORRECTION_TYPE: GLASSES
VA_OR_OD_CURRENT_RX: 20/20
VA_OR_OS_CURRENT_RX: 20/20-2

## 2024-05-31 ASSESSMENT — EXTERNAL EXAM - RIGHT EYE: OD_EXAM: NORMAL

## 2024-05-31 ASSESSMENT — EXTERNAL EXAM - LEFT EYE: OS_EXAM: NORMAL

## 2024-05-31 NOTE — PROGRESS NOTES
Assessment/Plan   Diagnoses and all orders for this visit:  Dry eyes  Keratoconus of right eye  History of cornea transplant  Irregular astigmatism of both eyes    Specialty Contact Lenses:  Specialty contact lenses were dispensed today for treatment of a medically indicated condition. The patient was educated on the proper care, handling, insertion and removal of the lenses.  The patient should follow the wearing time and return for follow-up as indicated, and call or come in to the office if the eye becomes red or painful after wearing the lenses.     Follow up 2-3 weeks and then perform DFE & Mac OCT

## 2024-07-16 ENCOUNTER — OFFICE VISIT (OUTPATIENT)
Dept: PRIMARY CARE | Facility: CLINIC | Age: 69
End: 2024-07-16
Payer: MEDICARE

## 2024-07-16 VITALS
HEIGHT: 77 IN | OXYGEN SATURATION: 96 % | WEIGHT: 261.8 LBS | RESPIRATION RATE: 18 BRPM | BODY MASS INDEX: 30.91 KG/M2 | HEART RATE: 61 BPM

## 2024-07-16 DIAGNOSIS — L03.115 CELLULITIS OF RIGHT LOWER EXTREMITY: Primary | ICD-10-CM

## 2024-07-16 DIAGNOSIS — L97.311 SKIN ULCER OF RIGHT ANKLE, LIMITED TO BREAKDOWN OF SKIN (MULTI): ICD-10-CM

## 2024-07-16 DIAGNOSIS — E78.00 HYPERCHOLESTEROLEMIA: ICD-10-CM

## 2024-07-16 PROCEDURE — 87075 CULTR BACTERIA EXCEPT BLOOD: CPT

## 2024-07-16 PROCEDURE — 13131 CMPLX RPR F/C/C/M/N/AX/G/H/F: CPT | Performed by: INTERNAL MEDICINE

## 2024-07-16 PROCEDURE — 87205 SMEAR GRAM STAIN: CPT

## 2024-07-16 PROCEDURE — 1036F TOBACCO NON-USER: CPT | Performed by: INTERNAL MEDICINE

## 2024-07-16 PROCEDURE — 99213 OFFICE O/P EST LOW 20 MIN: CPT | Performed by: INTERNAL MEDICINE

## 2024-07-16 PROCEDURE — 87070 CULTURE OTHR SPECIMN AEROBIC: CPT

## 2024-07-16 PROCEDURE — 1159F MED LIST DOCD IN RCRD: CPT | Performed by: INTERNAL MEDICINE

## 2024-07-16 RX ORDER — ACETAMINOPHEN 325 MG/1
500 TABLET ORAL EVERY 8 HOURS PRN
Qty: 90 TABLET | Refills: 2 | Status: CANCELLED | OUTPATIENT
Start: 2024-07-16

## 2024-07-16 RX ORDER — CIPROFLOXACIN 500 MG/1
500 TABLET ORAL 2 TIMES DAILY
Qty: 20 TABLET | Refills: 0 | Status: SHIPPED | OUTPATIENT
Start: 2024-07-16 | End: 2024-07-26

## 2024-07-16 RX ORDER — ATORVASTATIN CALCIUM 10 MG/1
10 TABLET, FILM COATED ORAL
Qty: 90 TABLET | Refills: 0 | Status: SHIPPED | OUTPATIENT
Start: 2024-07-16

## 2024-07-16 ASSESSMENT — ENCOUNTER SYMPTOMS
ENDOCRINE NEGATIVE: 1
OCCASIONAL FEELINGS OF UNSTEADINESS: 0
EYES NEGATIVE: 1
NEUROLOGICAL NEGATIVE: 1
RESPIRATORY NEGATIVE: 1
ALLERGIC/IMMUNOLOGIC NEGATIVE: 1
CARDIOVASCULAR NEGATIVE: 1
CONSTITUTIONAL NEGATIVE: 1
GASTROINTESTINAL NEGATIVE: 1
PSYCHIATRIC NEGATIVE: 1
LOSS OF SENSATION IN FEET: 0
HEMATOLOGIC/LYMPHATIC NEGATIVE: 1
MUSCULOSKELETAL NEGATIVE: 1
DEPRESSION: 0

## 2024-07-16 ASSESSMENT — PATIENT HEALTH QUESTIONNAIRE - PHQ9
SUM OF ALL RESPONSES TO PHQ9 QUESTIONS 1 AND 2: 0
1. LITTLE INTEREST OR PLEASURE IN DOING THINGS: NOT AT ALL
2. FEELING DOWN, DEPRESSED OR HOPELESS: NOT AT ALL

## 2024-07-16 NOTE — ASSESSMENT & PLAN NOTE
Ulcer  - lacerated and superficial wound on the  right ankle/leg/ - executed wound care - Wash with hydrogen peroxide and iodine for 3 coats after debridement of a yellowish/ and puss deposit on the wound base. The wound is packed with Xeroform and gauze bandage. Patient is to follow up in 48 hours for further treatment and packing of the wound. Recommend elevation of the limb and swelling prevention( prevention of venous stasis). Cultures taken

## 2024-07-18 ENCOUNTER — OFFICE VISIT (OUTPATIENT)
Dept: PRIMARY CARE | Facility: CLINIC | Age: 69
End: 2024-07-18
Payer: MEDICARE

## 2024-07-18 VITALS
WEIGHT: 261 LBS | OXYGEN SATURATION: 96 % | HEIGHT: 77 IN | RESPIRATION RATE: 16 BRPM | HEART RATE: 70 BPM | BODY MASS INDEX: 30.82 KG/M2

## 2024-07-18 DIAGNOSIS — L03.115 CELLULITIS OF RIGHT LOWER EXTREMITY: ICD-10-CM

## 2024-07-18 DIAGNOSIS — L97.311 SKIN ULCER OF RIGHT ANKLE, LIMITED TO BREAKDOWN OF SKIN (MULTI): Primary | ICD-10-CM

## 2024-07-18 PROBLEM — J20.9 ACUTE BRONCHITIS DUE TO INFECTION: Status: ACTIVE | Noted: 2024-07-18

## 2024-07-18 PROBLEM — R06.09 DYSPNEA ON EXERTION: Status: ACTIVE | Noted: 2024-07-18

## 2024-07-18 PROBLEM — I51.7 RIGHT VENTRICULAR DILATION: Status: ACTIVE | Noted: 2024-07-18

## 2024-07-18 PROBLEM — I51.9 RIGHT VENTRICULAR DYSFUNCTION: Status: ACTIVE | Noted: 2024-07-18

## 2024-07-18 PROBLEM — J45.991 COUGH VARIANT ASTHMA (HHS-HCC): Status: ACTIVE | Noted: 2024-07-18

## 2024-07-18 LAB
BACTERIA SPEC CULT: NORMAL
GRAM STN SPEC: NORMAL
GRAM STN SPEC: NORMAL

## 2024-07-18 ASSESSMENT — ENCOUNTER SYMPTOMS
RESPIRATORY NEGATIVE: 1
CARDIOVASCULAR NEGATIVE: 1
DEPRESSION: 0
PSYCHIATRIC NEGATIVE: 1
OCCASIONAL FEELINGS OF UNSTEADINESS: 0
GASTROINTESTINAL NEGATIVE: 1
MUSCULOSKELETAL NEGATIVE: 1
LOSS OF SENSATION IN FEET: 0
EYES NEGATIVE: 1
CONSTITUTIONAL NEGATIVE: 1
ENDOCRINE NEGATIVE: 1
ALLERGIC/IMMUNOLOGIC NEGATIVE: 1
NEUROLOGICAL NEGATIVE: 1
HEMATOLOGIC/LYMPHATIC NEGATIVE: 1

## 2024-07-18 ASSESSMENT — PATIENT HEALTH QUESTIONNAIRE - PHQ9
1. LITTLE INTEREST OR PLEASURE IN DOING THINGS: NOT AT ALL
2. FEELING DOWN, DEPRESSED OR HOPELESS: NOT AT ALL
SUM OF ALL RESPONSES TO PHQ9 QUESTIONS 1 AND 2: 0

## 2024-07-18 NOTE — PROGRESS NOTES
"Subjective   Patient ID: Miah Ventura is a 69 y.o. male who presents for Follow-up (Bug bite right ankle).    HPI     Review of Systems   Constitutional: Negative.    HENT: Negative.     Eyes: Negative.    Respiratory: Negative.     Cardiovascular: Negative.    Gastrointestinal: Negative.    Endocrine: Negative.    Musculoskeletal: Negative.    Skin: Negative.    Allergic/Immunologic: Negative.    Neurological: Negative.    Hematological: Negative.    Psychiatric/Behavioral: Negative.     All other systems reviewed and are negative.      Objective   Pulse 70   Resp 16   Ht 1.956 m (6' 5\")   Wt 118 kg (261 lb)   SpO2 96%   BMI 30.95 kg/m²     Physical Exam  Vitals and nursing note reviewed.   Constitutional:       Appearance: Normal appearance.   HENT:      Head: Normocephalic and atraumatic.      Right Ear: Tympanic membrane, ear canal and external ear normal.      Left Ear: Tympanic membrane, ear canal and external ear normal. There is no impacted cerumen.      Nose: Nose normal.      Mouth/Throat:      Mouth: Mucous membranes are moist.      Pharynx: Oropharynx is clear.   Eyes:      Extraocular Movements: Extraocular movements intact.      Conjunctiva/sclera: Conjunctivae normal.      Pupils: Pupils are equal, round, and reactive to light.   Cardiovascular:      Rate and Rhythm: Normal rate and regular rhythm.      Pulses: Normal pulses.      Heart sounds: Normal heart sounds. No murmur heard.  Pulmonary:      Effort: Pulmonary effort is normal. No respiratory distress.      Breath sounds: Normal breath sounds. No stridor. No wheezing, rhonchi or rales.   Chest:      Chest wall: No tenderness.   Abdominal:      General: Abdomen is flat. Bowel sounds are normal. There is no distension.      Palpations: Abdomen is soft. There is no mass.      Tenderness: There is no abdominal tenderness. There is no right CVA tenderness, left CVA tenderness, guarding or rebound.      Hernia: No hernia is present. "   Musculoskeletal:         General: Normal range of motion.      Cervical back: Normal range of motion and neck supple.   Skin:     General: Skin is warm.      Capillary Refill: Capillary refill takes less than 2 seconds.   Neurological:      General: No focal deficit present.      Mental Status: He is alert.      Cranial Nerves: No cranial nerve deficit.      Sensory: No sensory deficit.      Motor: No weakness.      Coordination: Coordination normal.      Gait: Gait normal.      Deep Tendon Reflexes: Reflexes normal.   Psychiatric:         Mood and Affect: Mood normal.         Behavior: Behavior normal. Behavior is cooperative.         Thought Content: Thought content normal.         Judgment: Judgment normal.         Assessment/Plan   Problem List Items Addressed This Visit             ICD-10-CM    Cellulitis of right lower extremity L03.115     Start Cipro 500 mg BID for 10 days          Skin ulcer of right ankle, limited to breakdown of skin (Multi) - Primary L97.311     Ulcer  - lacerated and superficial wound on the  right ankle/leg/ - obsere and leave open with superficial nonstick bandaging               Cellulitis of right lower extremity - Primary L03.115        Start Cipro 500 mg BID for 10 days            Relevant Medications     ciprofloxacin (Cipro) 500 mg tablet     Skin ulcer of right ankle, limited to breakdown of skin (Multi) L97.311       Ulcer  - lacerated and superficial wound on the  right ankle/leg/ - executed wound care - Wash with hydrogen peroxide and iodine for 3 coats after debridement of a yellowish/ and puss deposit on the wound base. The wound is packed with Xeroform and gauze bandage. Patient is to follow up in 48 hours for further treatment and packing of the wound. Recommend elevation of the limb and swelling prevention( prevention of venous stasis). Cultures taken

## 2024-07-18 NOTE — ASSESSMENT & PLAN NOTE
Ulcer  - lacerated and superficial wound on the  right ankle/leg/ - obsere and leave open with superficial nonstick bandaging

## 2024-11-25 ENCOUNTER — LAB (OUTPATIENT)
Dept: LAB | Facility: LAB | Age: 69
End: 2024-11-25
Payer: MEDICARE

## 2024-11-25 ENCOUNTER — APPOINTMENT (OUTPATIENT)
Dept: PRIMARY CARE | Facility: CLINIC | Age: 69
End: 2024-11-25
Payer: MEDICARE

## 2024-11-25 VITALS
HEART RATE: 59 BPM | SYSTOLIC BLOOD PRESSURE: 120 MMHG | HEIGHT: 77 IN | WEIGHT: 256 LBS | BODY MASS INDEX: 30.23 KG/M2 | OXYGEN SATURATION: 98 % | DIASTOLIC BLOOD PRESSURE: 75 MMHG | RESPIRATION RATE: 21 BRPM

## 2024-11-25 DIAGNOSIS — N40.1 BENIGN PROSTATIC HYPERPLASIA WITH URINARY FREQUENCY: ICD-10-CM

## 2024-11-25 DIAGNOSIS — N28.9 ABNORMAL KIDNEY FUNCTION: ICD-10-CM

## 2024-11-25 DIAGNOSIS — R53.83 MALAISE AND FATIGUE: ICD-10-CM

## 2024-11-25 DIAGNOSIS — R53.81 MALAISE AND FATIGUE: ICD-10-CM

## 2024-11-25 DIAGNOSIS — I10 BENIGN ESSENTIAL HYPERTENSION: ICD-10-CM

## 2024-11-25 DIAGNOSIS — R53.83 OTHER FATIGUE: Primary | ICD-10-CM

## 2024-11-25 DIAGNOSIS — E78.00 HYPERCHOLESTEROLEMIA: ICD-10-CM

## 2024-11-25 DIAGNOSIS — R35.0 BENIGN PROSTATIC HYPERPLASIA WITH URINARY FREQUENCY: ICD-10-CM

## 2024-11-25 DIAGNOSIS — R53.83 OTHER FATIGUE: ICD-10-CM

## 2024-11-25 DIAGNOSIS — N18.31 CHRONIC KIDNEY DISEASE, STAGE 3A (MULTI): ICD-10-CM

## 2024-11-25 LAB
ALBUMIN SERPL BCP-MCNC: 4.6 G/DL (ref 3.4–5)
ALP SERPL-CCNC: 63 U/L (ref 33–136)
ALT SERPL W P-5'-P-CCNC: 18 U/L (ref 10–52)
ANION GAP SERPL CALC-SCNC: 10 MMOL/L (ref 10–20)
AST SERPL W P-5'-P-CCNC: 21 U/L (ref 9–39)
BASOPHILS # BLD AUTO: 0.03 X10*3/UL (ref 0–0.1)
BASOPHILS NFR BLD AUTO: 0.7 %
BILIRUB SERPL-MCNC: 1.1 MG/DL (ref 0–1.2)
BUN SERPL-MCNC: 19 MG/DL (ref 6–23)
CALCIUM SERPL-MCNC: 9.3 MG/DL (ref 8.6–10.6)
CHLORIDE SERPL-SCNC: 107 MMOL/L (ref 98–107)
CHOLEST SERPL-MCNC: 177 MG/DL (ref 0–199)
CHOLESTEROL/HDL RATIO: 3.8
CO2 SERPL-SCNC: 28 MMOL/L (ref 21–32)
CREAT SERPL-MCNC: 1.19 MG/DL (ref 0.5–1.3)
EGFRCR SERPLBLD CKD-EPI 2021: 66 ML/MIN/1.73M*2
EOSINOPHIL # BLD AUTO: 0.21 X10*3/UL (ref 0–0.7)
EOSINOPHIL NFR BLD AUTO: 4.7 %
ERYTHROCYTE [DISTWIDTH] IN BLOOD BY AUTOMATED COUNT: 12.8 % (ref 11.5–14.5)
GLUCOSE SERPL-MCNC: 97 MG/DL (ref 74–99)
HCT VFR BLD AUTO: 45.7 % (ref 41–52)
HDLC SERPL-MCNC: 46.2 MG/DL
HGB BLD-MCNC: 15.3 G/DL (ref 13.5–17.5)
IMM GRANULOCYTES # BLD AUTO: 0.01 X10*3/UL (ref 0–0.7)
IMM GRANULOCYTES NFR BLD AUTO: 0.2 % (ref 0–0.9)
LDLC SERPL CALC-MCNC: 106 MG/DL
LYMPHOCYTES # BLD AUTO: 1.11 X10*3/UL (ref 1.2–4.8)
LYMPHOCYTES NFR BLD AUTO: 24.9 %
MCH RBC QN AUTO: 32.3 PG (ref 26–34)
MCHC RBC AUTO-ENTMCNC: 33.5 G/DL (ref 32–36)
MCV RBC AUTO: 97 FL (ref 80–100)
MONOCYTES # BLD AUTO: 0.62 X10*3/UL (ref 0.1–1)
MONOCYTES NFR BLD AUTO: 13.9 %
NEUTROPHILS # BLD AUTO: 2.47 X10*3/UL (ref 1.2–7.7)
NEUTROPHILS NFR BLD AUTO: 55.6 %
NON HDL CHOLESTEROL: 131 MG/DL (ref 0–149)
NRBC BLD-RTO: 0 /100 WBCS (ref 0–0)
PLATELET # BLD AUTO: 165 X10*3/UL (ref 150–450)
POTASSIUM SERPL-SCNC: 4.1 MMOL/L (ref 3.5–5.3)
PROT SERPL-MCNC: 6.7 G/DL (ref 6.4–8.2)
RBC # BLD AUTO: 4.73 X10*6/UL (ref 4.5–5.9)
SODIUM SERPL-SCNC: 141 MMOL/L (ref 136–145)
TRIGL SERPL-MCNC: 125 MG/DL (ref 0–149)
VLDL: 25 MG/DL (ref 0–40)
WBC # BLD AUTO: 4.5 X10*3/UL (ref 4.4–11.3)

## 2024-11-25 PROCEDURE — 85025 COMPLETE CBC W/AUTO DIFF WBC: CPT

## 2024-11-25 PROCEDURE — 3078F DIAST BP <80 MM HG: CPT | Performed by: INTERNAL MEDICINE

## 2024-11-25 PROCEDURE — 1036F TOBACCO NON-USER: CPT | Performed by: INTERNAL MEDICINE

## 2024-11-25 PROCEDURE — 99214 OFFICE O/P EST MOD 30 MIN: CPT | Performed by: INTERNAL MEDICINE

## 2024-11-25 PROCEDURE — 3074F SYST BP LT 130 MM HG: CPT | Performed by: INTERNAL MEDICINE

## 2024-11-25 PROCEDURE — 1159F MED LIST DOCD IN RCRD: CPT | Performed by: INTERNAL MEDICINE

## 2024-11-25 PROCEDURE — 80053 COMPREHEN METABOLIC PANEL: CPT

## 2024-11-25 PROCEDURE — 36415 COLL VENOUS BLD VENIPUNCTURE: CPT

## 2024-11-25 PROCEDURE — 80061 LIPID PANEL: CPT

## 2024-11-25 PROCEDURE — 3008F BODY MASS INDEX DOCD: CPT | Performed by: INTERNAL MEDICINE

## 2024-11-25 PROCEDURE — G2211 COMPLEX E/M VISIT ADD ON: HCPCS | Performed by: INTERNAL MEDICINE

## 2024-11-25 ASSESSMENT — ENCOUNTER SYMPTOMS
ALLERGIC/IMMUNOLOGIC NEGATIVE: 1
RESPIRATORY NEGATIVE: 1
NEUROLOGICAL NEGATIVE: 1
EYES NEGATIVE: 1
GASTROINTESTINAL NEGATIVE: 1
PSYCHIATRIC NEGATIVE: 1
ENDOCRINE NEGATIVE: 1
HEMATOLOGIC/LYMPHATIC NEGATIVE: 1
MUSCULOSKELETAL NEGATIVE: 1
CONSTITUTIONAL NEGATIVE: 1
CARDIOVASCULAR NEGATIVE: 1

## 2024-11-25 NOTE — PROGRESS NOTES
"Subjective   Patient ID: Miah Ventura is a 69 y.o. male who presents for Follow-up (Follow up).    HPI     Review of Systems   Constitutional: Negative.    HENT: Negative.     Eyes: Negative.    Respiratory: Negative.     Cardiovascular: Negative.    Gastrointestinal: Negative.    Endocrine: Negative.    Musculoskeletal: Negative.    Skin: Negative.    Allergic/Immunologic: Negative.    Neurological: Negative.    Hematological: Negative.    Psychiatric/Behavioral: Negative.     All other systems reviewed and are negative.      Objective   Pulse 59   Resp 21   Ht 1.956 m (6' 5\")   Wt 116 kg (256 lb)   SpO2 98%   BMI 30.36 kg/m²   Blood pressure 120/75, pulse 59, resp. rate 21, height 1.956 m (6' 5\"), weight 116 kg (256 lb), SpO2 98%.   Physical Exam  Vitals and nursing note reviewed.   Constitutional:       Appearance: Normal appearance.   HENT:      Head: Normocephalic and atraumatic.      Right Ear: Tympanic membrane, ear canal and external ear normal.      Left Ear: Tympanic membrane, ear canal and external ear normal. There is no impacted cerumen.      Nose: Nose normal.      Mouth/Throat:      Mouth: Mucous membranes are moist.      Pharynx: Oropharynx is clear.   Eyes:      Extraocular Movements: Extraocular movements intact.      Conjunctiva/sclera: Conjunctivae normal.      Pupils: Pupils are equal, round, and reactive to light.   Cardiovascular:      Rate and Rhythm: Normal rate and regular rhythm.      Pulses: Normal pulses.      Heart sounds: Normal heart sounds. No murmur heard.  Pulmonary:      Effort: Pulmonary effort is normal. No respiratory distress.      Breath sounds: Normal breath sounds. No stridor. No wheezing, rhonchi or rales.   Chest:      Chest wall: No tenderness.   Abdominal:      General: Abdomen is flat. Bowel sounds are normal. There is no distension.      Palpations: Abdomen is soft. There is no mass.      Tenderness: There is no abdominal tenderness. There is no right CVA " tenderness, left CVA tenderness, guarding or rebound.      Hernia: No hernia is present.   Musculoskeletal:         General: Normal range of motion.      Cervical back: Normal range of motion and neck supple.   Skin:     General: Skin is warm.      Capillary Refill: Capillary refill takes less than 2 seconds.   Neurological:      General: No focal deficit present.      Mental Status: He is alert.      Cranial Nerves: No cranial nerve deficit.      Sensory: No sensory deficit.      Motor: No weakness.      Coordination: Coordination normal.      Gait: Gait normal.      Deep Tendon Reflexes: Reflexes normal.   Psychiatric:         Mood and Affect: Mood normal.         Behavior: Behavior normal. Behavior is cooperative.         Thought Content: Thought content normal.         Judgment: Judgment normal.         Assessment/Plan   Problem List Items Addressed This Visit             ICD-10-CM    Abnormal kidney function N28.9     CRI - Chronic Renal Insuficiency. Secondary to DM/HTN/Atherosclerosis. Follow BUN/Cr. and lytes.   RENOPROTECTION with ACEI/ARB (). Monitor microalbuminuria. Avoid hypotension and renal hypoperfusion. The patient was instructed to avoid NSAIDS and educate compliance with medications to control HTN(hypertension) and cholesterol and diabetes.                                     Benign essential hypertension I10     HTN - hypertension well/controlled .Target BP < 130/80 achieved. Educate low salt diet and exercise with weight loss. Educate home self monitoring and diary keeping. Educate risks of elevate blood pressure and benefits of prompt treatment.          Benign prostatic hyperplasia N40.0     BPH - Benign PROSTATIC Hypertrophy, + Dysuria/Nocturia, check PSA, Educate exercises and Change in life style, prescribe vitamin E 400 IU qD.         Hypercholesterolemia E78.00     Hypercholesterolemia - Monitor lipid profile and educate patient upon risks of high cholesterol and targets. Educate diet and  change in lifestyle and increase in exercises - Refill:  Atorvastatin   and educate compliance with medication and diet.              Relevant Orders    Lipid Panel    Malaise and fatigue R53.81, R53.83     Fatigue - check CMP(metabolic panel and elctrolytes) , CBC(complete blood cell count), TSH(thyroid function). Insomnia may play a role and sleep studies(rule out sleep apnea) are recommended . Educate sleep hygiene. Consider anxiety disorder vs. depression. Consider Stress test, and 2DECHO.           Chronic kidney disease, stage 3a (Multi) N18.31     Other Visit Diagnoses         Codes    Other fatigue    -  Primary R53.83    Relevant Orders    CBC and Auto Differential    Comprehensive Metabolic Panel

## 2024-11-25 NOTE — ASSESSMENT & PLAN NOTE
BPH - Benign PROSTATIC Hypertrophy, + Dysuria/Nocturia, check PSA, Educate exercises and Change in life style, prescribe vitamin E 400 IU qD.

## 2025-01-02 DIAGNOSIS — E78.00 HYPERCHOLESTEROLEMIA: ICD-10-CM

## 2025-01-05 RX ORDER — ATORVASTATIN CALCIUM 10 MG/1
10 TABLET, FILM COATED ORAL DAILY
Qty: 90 TABLET | Refills: 0 | Status: SHIPPED | OUTPATIENT
Start: 2025-01-05

## 2025-04-06 DIAGNOSIS — E78.00 HYPERCHOLESTEROLEMIA: ICD-10-CM

## 2025-04-07 RX ORDER — ATORVASTATIN CALCIUM 10 MG/1
10 TABLET, FILM COATED ORAL DAILY
Qty: 30 TABLET | Refills: 0 | Status: SHIPPED | OUTPATIENT
Start: 2025-04-07

## 2025-04-16 NOTE — PROGRESS NOTES
"Subjective   Patient ID: Miah Ventura is a 69 y.o. male who presents for Follow-up (Urgent care bug bite). Insect bite on the right ankle     HPI     Review of Systems   Constitutional: Negative.    HENT: Negative.     Eyes: Negative.    Respiratory: Negative.     Cardiovascular: Negative.    Gastrointestinal: Negative.    Endocrine: Negative.    Musculoskeletal: Negative.    Skin: Negative.    Allergic/Immunologic: Negative.    Neurological: Negative.    Hematological: Negative.    Psychiatric/Behavioral: Negative.     All other systems reviewed and are negative.      Objective   Pulse 61   Resp 18   Ht 1.956 m (6' 5\")   Wt 119 kg (261 lb 12.8 oz)   SpO2 96%   BMI 31.04 kg/m²     Physical Exam  Vitals and nursing note reviewed.   Constitutional:       Appearance: Normal appearance.   HENT:      Head: Normocephalic and atraumatic.      Right Ear: Tympanic membrane, ear canal and external ear normal.      Left Ear: Tympanic membrane, ear canal and external ear normal. There is no impacted cerumen.      Nose: Nose normal.      Mouth/Throat:      Mouth: Mucous membranes are moist.      Pharynx: Oropharynx is clear.   Eyes:      Extraocular Movements: Extraocular movements intact.      Conjunctiva/sclera: Conjunctivae normal.      Pupils: Pupils are equal, round, and reactive to light.   Cardiovascular:      Rate and Rhythm: Normal rate and regular rhythm.      Pulses: Normal pulses.      Heart sounds: Normal heart sounds. No murmur heard.  Pulmonary:      Effort: Pulmonary effort is normal. No respiratory distress.      Breath sounds: Normal breath sounds. No stridor. No wheezing, rhonchi or rales.   Chest:      Chest wall: No tenderness.   Abdominal:      General: Abdomen is flat. Bowel sounds are normal. There is no distension.      Palpations: Abdomen is soft. There is no mass.      Tenderness: There is no abdominal tenderness. There is no right CVA tenderness, left CVA tenderness, guarding or rebound.      " Pt called back and requested to keep her appointment on 5/2/2025    Please advise   Hernia: No hernia is present.   Musculoskeletal:         General: Normal range of motion.      Cervical back: Normal range of motion and neck supple.   Skin:     General: Skin is warm.      Capillary Refill: Capillary refill takes less than 2 seconds.   Neurological:      General: No focal deficit present.      Mental Status: He is alert.      Cranial Nerves: No cranial nerve deficit.      Sensory: No sensory deficit.      Motor: No weakness.      Coordination: Coordination normal.      Gait: Gait normal.      Deep Tendon Reflexes: Reflexes normal.   Psychiatric:         Mood and Affect: Mood normal.         Behavior: Behavior normal. Behavior is cooperative.         Thought Content: Thought content normal.         Judgment: Judgment normal.         Assessment/Plan   Problem List Items Addressed This Visit             ICD-10-CM    Hypercholesterolemia E78.00    Relevant Medications    atorvastatin (Lipitor) 10 mg tablet    Cellulitis of right lower extremity - Primary L03.115     Start Cipro 500 mg BID for 10 days          Relevant Medications    ciprofloxacin (Cipro) 500 mg tablet    Skin ulcer of right ankle, limited to breakdown of skin (Multi) L97.311     Ulcer  - lacerated and superficial wound on the  right ankle/leg/ - executed wound care - Wash with hydrogen peroxide and iodine for 3 coats after debridement of a yellowish/ and puss deposit on the wound base. The wound is packed with Xeroform and gauze bandage. Patient is to follow up in 48 hours for further treatment and packing of the wound. Recommend elevation of the limb and swelling prevention( prevention of venous stasis). Cultures taken

## 2025-05-12 DIAGNOSIS — E78.00 HYPERCHOLESTEROLEMIA: ICD-10-CM

## 2025-05-12 RX ORDER — ATORVASTATIN CALCIUM 10 MG/1
10 TABLET, FILM COATED ORAL DAILY
Qty: 90 TABLET | Refills: 1 | Status: SHIPPED | OUTPATIENT
Start: 2025-05-12

## 2025-10-01 ENCOUNTER — APPOINTMENT (OUTPATIENT)
Dept: PRIMARY CARE | Facility: CLINIC | Age: 70
End: 2025-10-01
Payer: MEDICARE